# Patient Record
(demographics unavailable — no encounter records)

---

## 2024-12-26 NOTE — HISTORY OF PRESENT ILLNESS
[FreeTextEntry1] : 82 year old women with medical h/o hypothyroidism, MNG, HTN, PE (on Eliquis) and uterovaginal prolapse presented to [facility] care.  She used to follow up with Dr. Duckworth.   No recent labs.   Thyroid US- Nov 2024 llp- 1 x 1 cm calcified nodule TR-4 Isthmus- 0.6 x 0.4 x 0.6 hypoechoic nodule- TR-4  She was diagnosed for hypothyroidism about 15 years ago. She currently takes brand Synthroid 137 mcg daily. She was on 150 mcg about 6 month before that.  She has always been taking brand Synthroid.   She was recently found to have thyroid nodule during other imaging.   No prior Bone scan.  Takes vit D 2000 IU daily.  Doesn't consume much dairy.  No h/o fragility Fx.  No h/o nephrolithiasis.  Had a fall few weeks ago.

## 2024-12-26 NOTE — ASSESSMENT
[FreeTextEntry1] : 1. Hypothyroid No recent labs Plan: - Check labs ASAP. WILL CALL YOU TO DISCUSS THE RESULTS AFTER LABS ARE RECIEVED AND REVIEWED.  - Takes brand Synthroid 137 mcg daily   2. MNG Plan: Thyroid US in 6 month for interval growth evaluation    3. Bone Health  Takes Vit D 2000 IU daily  No prior bone scan  Plan: - Vit D level  - Bone scan   Follow up 4-6 month

## 2025-06-11 NOTE — ASSESSMENT
[FreeTextEntry1] : 1. Hypothyroid Plan: - Check labs fasting before 9 am in 3 month. WILL CALL YOU TO DISCUSS THE RESULTS AFTER LABS ARE RECIEVED AND REVIEWED.  - Decrease Synthroid 137 mcg  to 6 days a week    2. MNG- will proceed with Thyroid US for interval growth evaluation    3. Bone Health  Takes Vit D 2000 IU daily  No prior bone scan  Plan: - c/w Vit D supplementation  - Bone scan   Follow up 6 month

## 2025-06-11 NOTE — HISTORY OF PRESENT ILLNESS
[FreeTextEntry1] : 82 year old women with medical h/o hypothyroidism, MNG, HTN, PE (on Eliquis), alpha gal syn and uterovaginal prolapse presented for follow up.  She used to follow up with Dr. Duckworth.   Didn't complete thyroid US or Bone scan   Labs  June 2025- eGFR 69, TSH 0.5, Free T4 of 1.7, Free T3 of 3, Vit D 35.8  Thyroid US- Nov 2024 llp- 1 x 1 cm calcified nodule TR-4 Isthmus- 0.6 x 0.4 x 0.6 hypoechoic nodule- TR-4  She was diagnosed for hypothyroidism about 15 years ago. She currently takes brand Synthroid 137 mcg daily. She was on 150 mcg about 6 month before that.  She has always been taking brand Synthroid.   She was recently found to have thyroid nodule during other imaging.   No prior Bone scan.  Takes vit D 2000 IU daily.  Doesn't consume much dairy.  No h/o fragility Fx.  No h/o nephrolithiasis.  Had a fall few weeks ago.

## 2025-06-11 NOTE — REASON FOR VISIT
[Hypothyroidism] : hypothyroidism [Osteoporosis] : osteoporosis [Thyroid nodule/ MNG] : thyroid nodule/ MNG

## 2025-06-11 NOTE — PHYSICAL EXAM
[No Respiratory Distress] : no respiratory distress [Obese] : obese [Normal Insight/Judgement] : insight and judgment were intact

## 2025-07-10 NOTE — DISCUSSION/SUMMARY
[FreeTextEntry1] : MRI Brain 4/2025  No acute infarct. Moderate periventricular and subcortical white matter chronic microvascular ischemic changes. Old calcified meningioma overlying left frontal convexity.  CT PERFUSION: There is no reported defect in CBF or region of elevated Tmax > 6 seconds, nor mismatch in volume thereof to indicate ischemia or penumbra.  CTA BRAIN The internal carotid arteries are patent at the skull base and intracranial compartment, without occlusion or significant stenosis despite atherosclerotic calcification.  The anterior and middle cerebral arteries are patent at their 1st and 2nd order segments, without branch occlusion or high-grade stenosis. Posterior circulation is similarly patent. Both intracranial vertebral arteries, the basilar artery and both posterior cerebral arteries are nonstenotic despite calcification along the V4 segments. Bilateral posterior inferior cerebellar arteries also patent. There is no site of aneurysm within the resolution limitations of CTA.  The dural venous sinuses appear contrast-filled; no filling defect indicate venous thrombosis.  CTA NECK: The aortic arch is normal caliber. There is standard 3 vessel configuration, without great vessel stenosis or evidence of dissection. Both common carotid arteries are patent and nonstenotic up to the bifurcations. Left ICA: Normal caliber and nonstenotic. No filling defect or evidence of dissection. Right ICA: Normal caliber and nonstenotic. No filling defect or evidence of dissection.  Vertebral arteries: Patent throughout from origin to foramen magnum. No hemodynamically significant stenosis or evidence of dissection.   Plan:

## 2025-07-14 NOTE — HISTORY OF PRESENT ILLNESS
[FreeTextEntry1] : Patient is an 82-year-old female with medical history of Hypertension, Hypothyroidism, Mitral valve regurgitation, and Pulmonary embolism (9/2019, on Eliquis).  [place] [facility] [place] for fever and AMS from PNA. Patient also with sepsis at [facility] recently.  Patient seen with daughter today.    Patient was seen at [facility] in April 2025 for complaint of transient speech disturbance.  Per chart review patient had transient aphasia and dysarthria.  She was with her  around 2:30 PM that day eating when he noticed she developed sudden onset of difficulty with speech and words not making sense.  Per patient and  episode lasted less than 2 minutes, today stating around 20 seconds.  With no other focal deficit noted at that time.  Patient at this time has baseline mental status.  This was her only event of this episode.  Patient follows with , denies having echo or heart monitor placed since this event.  Patient on Eliquis 5 mg twice daily, aspirin 81 and atorvastatin 40 at home.  Of note patient has been trending very low with hemoglobin around 8.4-->7.  Patient has received blood transfusion while at [facility]. She denies any blood in her stool or urine.  Daughter states some blood in her sputum when she had double pneumonia recently.  She denies any significant dizziness at this time and feels fairly well.  She denies any history of endoscopy or colonoscopy.  Daughter also states lack of comprehension at times will some memory deficit.  Denies new numbness, tingling, vision disturbance, focal weakness, CP

## 2025-07-14 NOTE — ASSESSMENT
[FreeTextEntry1] : Patient presenting to me for transient speech disturbance that lasted approximately 20 seconds.  Remote history of double pneumonia with sepsis recently.  Very low hemoglobin trending down to 7.  Recent blood transfusion at [facility] [place].  Currently without dizziness.   MRI Brain 4/2025  No acute infarct. Moderate periventricular and subcortical white matter chronic microvascular ischemic changes. Old calcified meningioma overlying left frontal convexity.  CT PERFUSION: There is no reported defect in CBF or region of elevated Tmax > 6 seconds, nor mismatch in volume thereof to indicate ischemia or penumbra.  CTA BRAIN The internal carotid arteries are patent at the skull base and intracranial compartment, without occlusion or significant stenosis despite atherosclerotic calcification. The anterior and middle cerebral arteries are patent at their 1st and 2nd order segments, without branch occlusion or high-grade stenosis. Posterior circulation is similarly patent. Both intracranial vertebral arteries, the basilar artery and both posterior cerebral arteries are nonstenotic despite calcification along the V4 segments. Bilateral posterior inferior cerebellar arteries also patent. There is no site of aneurysm within the resolution limitations of CTA. The dural venous sinuses appear contrast-filled; no filling defect indicate venous thrombosis.  CTA NECK: The aortic arch is normal caliber. There is standard 3 vessel configuration, without great vessel stenosis or evidence of dissection. Both common carotid arteries are patent and nonstenotic up to the bifurcations. Bilateral ICA: Normal caliber and nonstenotic. No filling defect or evidence of dissection. Vertebral arteries: Patent throughout from origin to foramen magnum. No hemodynamically significant stenosis or evidence of dissection.  Patient with transient speech disturbance that lasted less than 2 minutes, possible TIA-like event--could be also secondary to dehydration or hypoxic with low hemoglobin.  Pending further evaluation also would like to have stat hematology follow-up and GI follow-up to find source of possible bleed.  Plan: - Continue on Eliquis and aspirin at this time - Stat hematology follow-up -Follow-up with cardiology for echo and heart monitor - CBC with differential STAT - Fall precaution - Continue on atorvastatin -Would follow-up with GI for colonoscopy endoscopy  Follow up in 3 weeks   Answered all questions and concerns to the best of my ability. Advised to call for any new or worsening symptoms.   In order to maintain continuity of care/prescription refills, patients must be seen on a yearly basis.   Total time spent on the day of the visit, including pre-visit and post-visit time was 60 minutes.

## 2025-07-14 NOTE — PHYSICAL EXAM
[FreeTextEntry1] : GENERAL PHYSICAL EXAM: GEN: no acute distress, normal affect EYES:  sclera white, conjunctiva clear PULM: no respiratory distress, normal rate EXT: no edema, no cyanosis SKIN: warm, dry, no rash or lesion on exposed skin   NEUROLOGICAL EXAM: MENTAL STATUS Orientation: alert and oriented to person, place, time, and situation Language: clear and fluent, intact comprehension and repetition   CRANIAL NERVES II: visual fields full to confrontation III, IV, VI:  PERRL, EOMI, VFF, no nystagmus V, VII: facial sensation and movement intact and symmetric XI: BL shoulder shrug intact   MOTOR: Bilateral muscle strength 4+/5 in UE and LE, proximally and distally, symmetric throughout Tone and bulk are normal in upper and lower limbs. No abnormal movements   SENSATION: Intact to light touch in all 4 EXTs    COORDINATION: Normal FNF bilateral

## 2025-07-14 NOTE — HISTORY OF PRESENT ILLNESS
[FreeTextEntry1] : Patient is an 82-year-old female with medical history of Hypertension, Hypothyroidism, Mitral valve regurgitation, and Pulmonary embolism (9/2019, on Eliquis).  Patient was seen at [facility] [place] on 6/23/2025 for fever and AMS from PNA. Patient also with sepsis at [facility] [place] recently.  Patient seen with daughter today.    Patient was seen at [facility] [place] in April 2025 for complaint of transient speech disturbance.  Per chart review patient had transient aphasia and dysarthria.  She was with her  around 2:30 PM that day eating when he noticed she developed sudden onset of difficulty with speech and words not making sense.  Per patient and  episode lasted less than 2 minutes, today stating around 20 seconds.  With no other focal deficit noted at that time.  Patient at this time has baseline mental status.  This was her only event of this episode.  Patient follows with , denies having echo or heart monitor placed since this event.  Patient on Eliquis 5 mg twice daily, aspirin 81 and atorvastatin 40 at home.  Of note patient has been trending very low with hemoglobin around 8.4-->7.  Patient has received blood transfusion while at [facility] [place]. She denies any blood in her stool or urine.  Daughter states some blood in her sputum when she had double pneumonia recently.  She denies any significant dizziness at this time and feels fairly well.  She denies any history of endoscopy or colonoscopy.  Daughter also states lack of comprehension at times will some memory deficit.  Denies new numbness, tingling, vision disturbance, focal weakness, CP

## 2025-07-14 NOTE — ASSESSMENT
[FreeTextEntry1] : Patient presenting to me for transient speech disturbance that lasted approximately 20 seconds.  Remote history of double pneumonia with sepsis recently.  Very low hemoglobin trending down to 7.  Recent blood transfusion at [facility] [place].  Currently without dizziness.   MRI Brain 4/2025  No acute infarct. Moderate periventricular and subcortical white matter chronic microvascular ischemic changes. Old calcified meningioma overlying left frontal convexity.  CT PERFUSION: There is no reported defect in CBF or region of elevated Tmax > 6 seconds, nor mismatch in volume thereof to indicate ischemia or penumbra.  CTA BRAIN The internal carotid arteries are patent at the skull base and intracranial compartment, without occlusion or significant stenosis despite atherosclerotic calcification. The anterior and middle cerebral arteries are patent at their 1st and 2nd order segments, without branch occlusion or high-grade stenosis. Posterior circulation is similarly patent. Both intracranial vertebral arteries, the basilar artery and both posterior cerebral arteries are nonstenotic despite calcification along the V4 segments. Bilateral posterior inferior cerebellar arteries also patent. There is no site of aneurysm within the resolution limitations of CTA. The dural venous sinuses appear contrast-filled; no filling defect indicate venous thrombosis.  CTA NECK: The aortic arch is normal caliber. There is standard 3 vessel configuration, without great vessel stenosis or evidence of dissection. Both common carotid arteries are patent and nonstenotic up to the bifurcations. Bilateral ICA: Normal caliber and nonstenotic. No filling defect or evidence of dissection. Vertebral arteries: Patent throughout from origin to foramen magnum. No hemodynamically significant stenosis or evidence of dissection.  Patient with transient speech disturbance that lasted less than 2 minutes, possible TIA-like event--could be also secondary to dehydration or hypoxic with low hemoglobin.  Pending further evaluation also would like to have stat hematology follow-up and GI follow-up to find source of possible bleed.  Plan: - Continue on Eliquis and aspirin at this time - Stat hematology follow-up -Follow-up with cardiology for echo and heart monitor - CBC with differential STAT - Fall precaution - Continue on atorvastatin -Would follow-up with GI for colonoscopy endoscopy  Follow up in 3 weeks   Answered all questions and concerns to the best of my ability. Advised to call for any new or worsening symptoms.   In order to maintain continuity of care/prescription refills, patients must be seen on a yearly basis.   Total time spent on the day of the visit, including pre-visit and post-visit time was 60 minutes.